# Patient Record
Sex: FEMALE | Race: WHITE
[De-identification: names, ages, dates, MRNs, and addresses within clinical notes are randomized per-mention and may not be internally consistent; named-entity substitution may affect disease eponyms.]

---

## 2021-07-28 ENCOUNTER — HOSPITAL ENCOUNTER (OUTPATIENT)
Dept: HOSPITAL 35 - PAIN | Age: 86
Discharge: HOME | End: 2021-07-28
Payer: COMMERCIAL

## 2021-07-28 VITALS — DIASTOLIC BLOOD PRESSURE: 75 MMHG | SYSTOLIC BLOOD PRESSURE: 146 MMHG

## 2021-07-28 VITALS — HEIGHT: 70.98 IN | WEIGHT: 172 LBS | BODY MASS INDEX: 24.08 KG/M2

## 2021-07-28 DIAGNOSIS — Z88.8: ICD-10-CM

## 2021-07-28 DIAGNOSIS — Z98.890: ICD-10-CM

## 2021-07-28 DIAGNOSIS — Z87.891: ICD-10-CM

## 2021-07-28 DIAGNOSIS — Z88.0: ICD-10-CM

## 2021-07-28 DIAGNOSIS — Z87.19: ICD-10-CM

## 2021-07-28 DIAGNOSIS — Z86.010: ICD-10-CM

## 2021-07-28 DIAGNOSIS — E78.2: ICD-10-CM

## 2021-07-28 DIAGNOSIS — G89.29: ICD-10-CM

## 2021-07-28 DIAGNOSIS — H92.02: Primary | ICD-10-CM

## 2021-07-28 DIAGNOSIS — I10: ICD-10-CM

## 2021-07-28 DIAGNOSIS — Z90.710: ICD-10-CM

## 2021-07-28 DIAGNOSIS — Z79.899: ICD-10-CM

## 2021-07-28 DIAGNOSIS — K21.9: ICD-10-CM

## 2021-07-28 NOTE — NUR
Pain Clinic Assessment:
 
1. History of Osteoarthritis:
Not Applicable
   History of Rheumatoid Arthritis:
Not Applicable
 
2. Height: 5 ft. 11 in. 180.3 cm.
   Weight: 172.0 lb.  oz. 78.019 kg.
   Patient's BMI: 24.0
 
3. Vital Signs:
   BP: 146/75 Pulse: 90 Resp: 20
   Temp:  02 Sat: 97 ECG Mon:
 
4. Pain Intensity: 8
 
5. Fall Risk:
   Dizziness: Y  Needs help standing or walking: Y
   Fallen in the last 3 months: Y
   Fall risk comments:
 
 
6. Patient on Blood Thinner: None
 
7. History of Hypertension: N
 
8. Opioid Therapy greater than 6 weeks:
   Opiate Contract Signed:
 
9. Risk Assessment Tool Provided: LOW RISK 0
 
10. Functional Assessment Tool: 10/70
 
11. Recreational Drug Use: Never Drug Type:
    Tobacco Use: Former Smoker Tobacco Type: Cigarettes
       Amount or Packs/day:  How Many Years:
    Alcohol Use: Yes  Frequency: Special Occasions Quant: 1